# Patient Record
Sex: FEMALE | Race: WHITE | NOT HISPANIC OR LATINO | Employment: OTHER | URBAN - METROPOLITAN AREA
[De-identification: names, ages, dates, MRNs, and addresses within clinical notes are randomized per-mention and may not be internally consistent; named-entity substitution may affect disease eponyms.]

---

## 2018-02-19 DIAGNOSIS — Z11.3 SCREENING EXAMINATION FOR VENEREAL DISEASE: Primary | ICD-10-CM

## 2018-02-19 DIAGNOSIS — Z11.59 SCREENING EXAMINATION FOR POLIOMYELITIS: ICD-10-CM

## 2018-02-19 DIAGNOSIS — Z01.812 PRE-OPERATIVE LABORATORY EXAMINATION: ICD-10-CM

## 2018-02-19 DIAGNOSIS — Z11.4 SCREENING FOR HUMAN IMMUNODEFICIENCY VIRUS: ICD-10-CM

## 2018-02-19 DIAGNOSIS — Z11.8 SCREENING EXAMINATION FOR TRACHOMA: ICD-10-CM

## 2018-02-19 DIAGNOSIS — Z13.220 SCREENING FOR LIPOID DISORDERS: ICD-10-CM

## 2018-02-19 DIAGNOSIS — Z13.29 SCREENING FOR THYROID DISORDER: ICD-10-CM

## 2018-02-19 DIAGNOSIS — Z01.83 ENCOUNTER FOR BLOOD TYPING: ICD-10-CM

## 2018-02-19 DIAGNOSIS — Z01.84 IMMUNITY STATUS TESTING: ICD-10-CM

## 2018-02-19 DIAGNOSIS — Z31.89 ENCOUNTER FOR ARTIFICIAL INSEMINATION: ICD-10-CM

## 2018-02-20 DIAGNOSIS — Z13.29 SCREENING FOR THYROID DISORDER: ICD-10-CM

## 2018-02-20 DIAGNOSIS — Z11.4 SCREENING FOR HUMAN IMMUNODEFICIENCY VIRUS: ICD-10-CM

## 2018-02-20 DIAGNOSIS — Z11.59 SCREENING EXAMINATION FOR POLIOMYELITIS: ICD-10-CM

## 2018-02-20 DIAGNOSIS — Z13.220 SCREENING FOR LIPOID DISORDERS: ICD-10-CM

## 2018-02-20 DIAGNOSIS — Z11.3 SCREENING EXAMINATION FOR VENEREAL DISEASE: ICD-10-CM

## 2018-02-20 DIAGNOSIS — Z01.83 ENCOUNTER FOR BLOOD TYPING: ICD-10-CM

## 2018-02-20 DIAGNOSIS — Z31.89 ENCOUNTER FOR ARTIFICIAL INSEMINATION: ICD-10-CM

## 2018-02-20 DIAGNOSIS — Z01.812 PRE-OPERATIVE LABORATORY EXAMINATION: ICD-10-CM

## 2018-02-20 DIAGNOSIS — Z11.8 SCREENING EXAMINATION FOR TRACHOMA: ICD-10-CM

## 2018-02-20 DIAGNOSIS — Z01.84 IMMUNITY STATUS TESTING: ICD-10-CM

## 2018-02-20 LAB
ALBUMIN SERPL-MCNC: 4 G/DL (ref 3.4–5)
ALP SERPL-CCNC: 47 U/L (ref 40–150)
ALT SERPL W P-5'-P-CCNC: 17 U/L (ref 0–50)
AMPHETAMINES UR QL SCN: NEGATIVE
ANION GAP SERPL CALCULATED.3IONS-SCNC: 4 MMOL/L (ref 3–14)
AST SERPL W P-5'-P-CCNC: 11 U/L (ref 0–45)
BILIRUB SERPL-MCNC: 0.5 MG/DL (ref 0.2–1.3)
BLD GP AB SCN SERPL QL: NORMAL
BUN SERPL-MCNC: 16 MG/DL (ref 7–30)
CALCIUM SERPL-MCNC: 7.9 MG/DL (ref 8.5–10.1)
CANNABINOIDS UR QL: NEGATIVE
CHLORIDE SERPL-SCNC: 106 MMOL/L (ref 94–109)
CO2 SERPL-SCNC: 30 MMOL/L (ref 20–32)
COCAINE UR QL: NEGATIVE
CREAT SERPL-MCNC: 0.73 MG/DL (ref 0.52–1.04)
DEPRECATED CALCIDIOL+CALCIFEROL SERPL-MC: 10 UG/L (ref 20–75)
ERYTHROCYTE [DISTWIDTH] IN BLOOD BY AUTOMATED COUNT: 12.7 % (ref 10–15)
GFR SERPL CREATININE-BSD FRML MDRD: >90 ML/MIN/1.7M2
GLUCOSE SERPL-MCNC: 94 MG/DL (ref 70–99)
HBV SURFACE AG SERPL QL IA: NONREACTIVE
HCT VFR BLD AUTO: 39.6 % (ref 35–47)
HCV AB SERPL QL IA: NONREACTIVE
HGB BLD-MCNC: 13.3 G/DL (ref 11.7–15.7)
HIV 1+2 AB+HIV1 P24 AG SERPL QL IA: NONREACTIVE
MCH RBC QN AUTO: 29.9 PG (ref 26.5–33)
MCHC RBC AUTO-ENTMCNC: 33.6 G/DL (ref 31.5–36.5)
MCV RBC AUTO: 89 FL (ref 78–100)
OPIATES UR QL SCN: NEGATIVE
PCP UR QL SCN: NEGATIVE
PLATELET # BLD AUTO: 248 10E9/L (ref 150–450)
POTASSIUM SERPL-SCNC: 3.5 MMOL/L (ref 3.4–5.3)
PROT SERPL-MCNC: 6.9 G/DL (ref 6.8–8.8)
RBC # BLD AUTO: 4.45 10E12/L (ref 3.8–5.2)
SODIUM SERPL-SCNC: 140 MMOL/L (ref 133–144)
T4 FREE SERPL-MCNC: 0.95 NG/DL (ref 0.76–1.46)
TSH SERPL DL<=0.005 MIU/L-ACNC: 0.86 MU/L (ref 0.4–4)
WBC # BLD AUTO: 6.2 10E9/L (ref 4–11)

## 2018-02-20 PROCEDURE — 87340 HEPATITIS B SURFACE AG IA: CPT | Performed by: SPECIALIST

## 2018-02-20 PROCEDURE — 87591 N.GONORRHOEAE DNA AMP PROB: CPT | Performed by: SPECIALIST

## 2018-02-20 PROCEDURE — 87389 HIV-1 AG W/HIV-1&-2 AB AG IA: CPT | Performed by: SPECIALIST

## 2018-02-20 PROCEDURE — 85027 COMPLETE CBC AUTOMATED: CPT | Performed by: SPECIALIST

## 2018-02-20 PROCEDURE — 86787 VARICELLA-ZOSTER ANTIBODY: CPT | Performed by: SPECIALIST

## 2018-02-20 PROCEDURE — 86803 HEPATITIS C AB TEST: CPT | Performed by: SPECIALIST

## 2018-02-20 PROCEDURE — 36415 COLL VENOUS BLD VENIPUNCTURE: CPT | Performed by: SPECIALIST

## 2018-02-20 PROCEDURE — 80307 DRUG TEST PRSMV CHEM ANLYZR: CPT | Performed by: SPECIALIST

## 2018-02-20 PROCEDURE — 86762 RUBELLA ANTIBODY: CPT | Performed by: SPECIALIST

## 2018-02-20 PROCEDURE — 82306 VITAMIN D 25 HYDROXY: CPT | Performed by: SPECIALIST

## 2018-02-20 PROCEDURE — 84443 ASSAY THYROID STIM HORMONE: CPT | Performed by: SPECIALIST

## 2018-02-20 PROCEDURE — 84439 ASSAY OF FREE THYROXINE: CPT | Performed by: SPECIALIST

## 2018-02-20 PROCEDURE — 80053 COMPREHEN METABOLIC PANEL: CPT | Performed by: SPECIALIST

## 2018-02-20 PROCEDURE — 86780 TREPONEMA PALLIDUM: CPT | Performed by: SPECIALIST

## 2018-02-20 PROCEDURE — 87491 CHLMYD TRACH DNA AMP PROBE: CPT | Performed by: SPECIALIST

## 2018-02-20 PROCEDURE — 86850 RBC ANTIBODY SCREEN: CPT | Performed by: SPECIALIST

## 2018-02-21 LAB
C TRACH DNA SPEC QL NAA+PROBE: NEGATIVE
N GONORRHOEA DNA SPEC QL NAA+PROBE: NEGATIVE
RUBV IGG SERPL IA-ACNC: 26 IU/ML
SPECIMEN SOURCE: NORMAL
SPECIMEN SOURCE: NORMAL
T PALLIDUM IGG+IGM SER QL: NEGATIVE
VZV IGG SER QL IA: 3.7 AI (ref 0–0.8)

## 2018-03-15 DIAGNOSIS — Z31.83 ENCOUNTER FOR ASSISTED REPRODUCTIVE FERTILITY CYCLE: Primary | ICD-10-CM

## 2018-04-02 DIAGNOSIS — Z31.83 ENCOUNTER FOR ASSISTED REPRODUCTIVE FERTILITY CYCLE: ICD-10-CM

## 2018-04-02 LAB — ESTRADIOL SERPL-MCNC: 53 PG/ML

## 2018-04-02 PROCEDURE — 36415 COLL VENOUS BLD VENIPUNCTURE: CPT | Performed by: SPECIALIST

## 2018-04-02 PROCEDURE — 82670 ASSAY OF TOTAL ESTRADIOL: CPT | Performed by: SPECIALIST

## 2018-04-12 DIAGNOSIS — Z31.83 ENCOUNTER FOR ASSISTED REPRODUCTIVE FERTILITY CYCLE: ICD-10-CM

## 2018-04-12 LAB
ESTRADIOL SERPL-MCNC: 171 PG/ML
PROGEST SERPL-MCNC: 0.3 NG/ML

## 2018-04-12 PROCEDURE — 84144 ASSAY OF PROGESTERONE: CPT | Performed by: SPECIALIST

## 2018-04-12 PROCEDURE — 36415 COLL VENOUS BLD VENIPUNCTURE: CPT | Performed by: SPECIALIST

## 2018-04-12 PROCEDURE — 82670 ASSAY OF TOTAL ESTRADIOL: CPT | Performed by: SPECIALIST

## 2018-04-16 DIAGNOSIS — Z31.83 ENCOUNTER FOR ASSISTED REPRODUCTIVE FERTILITY CYCLE: Primary | ICD-10-CM

## 2018-04-17 DIAGNOSIS — Z31.83 ENCOUNTER FOR ASSISTED REPRODUCTIVE FERTILITY CYCLE: ICD-10-CM

## 2018-04-17 LAB
ESTRADIOL SERPL-MCNC: 646 PG/ML
PROGEST SERPL-MCNC: <0.2 NG/ML

## 2018-04-17 PROCEDURE — 82670 ASSAY OF TOTAL ESTRADIOL: CPT | Performed by: SPECIALIST

## 2018-04-17 PROCEDURE — 36415 COLL VENOUS BLD VENIPUNCTURE: CPT | Performed by: SPECIALIST

## 2018-04-17 PROCEDURE — 84144 ASSAY OF PROGESTERONE: CPT | Performed by: SPECIALIST

## 2018-04-20 DIAGNOSIS — Z31.83 ENCOUNTER FOR ASSISTED REPRODUCTIVE FERTILITY CYCLE: Primary | ICD-10-CM

## 2018-04-20 DIAGNOSIS — Z31.83 ENCOUNTER FOR ASSISTED REPRODUCTIVE FERTILITY CYCLE: ICD-10-CM

## 2018-04-20 LAB
ESTRADIOL SERPL-MCNC: 715 PG/ML
PROGEST SERPL-MCNC: 19.8 NG/ML

## 2018-04-20 PROCEDURE — 82670 ASSAY OF TOTAL ESTRADIOL: CPT | Performed by: SPECIALIST

## 2018-04-20 PROCEDURE — 36415 COLL VENOUS BLD VENIPUNCTURE: CPT | Performed by: SPECIALIST

## 2018-04-20 PROCEDURE — 84144 ASSAY OF PROGESTERONE: CPT | Performed by: SPECIALIST

## 2018-04-27 DIAGNOSIS — Z32.00 PREGNANCY EXAMINATION OR TEST, PREGNANCY UNCONFIRMED: Primary | ICD-10-CM

## 2018-08-23 ENCOUNTER — MEDICAL CORRESPONDENCE (OUTPATIENT)
Dept: HEALTH INFORMATION MANAGEMENT | Facility: CLINIC | Age: 33
End: 2018-08-23

## 2018-08-23 ENCOUNTER — TRANSFERRED RECORDS (OUTPATIENT)
Dept: HEALTH INFORMATION MANAGEMENT | Facility: CLINIC | Age: 33
End: 2018-08-23

## 2018-09-13 ENCOUNTER — TELEPHONE (OUTPATIENT)
Dept: MATERNAL FETAL MEDICINE | Facility: CLINIC | Age: 33
End: 2018-09-13

## 2018-09-13 NOTE — TELEPHONE ENCOUNTER
Per Meenakshi Fountain-        Patient was scheduled for an echo on 9/7, but per pt, insurance is out of network and so her and her partner have decided to find a place to complete exam that is in network. Writer will remove orders.     Kay Walters

## 2020-10-05 DIAGNOSIS — Z31.83 ENCOUNTER FOR ASSISTED REPRODUCTIVE FERTILITY CYCLE: Primary | ICD-10-CM

## 2022-08-10 ENCOUNTER — LAB REQUISITION (OUTPATIENT)
Dept: LAB | Facility: CLINIC | Age: 37
End: 2022-08-10
Payer: COMMERCIAL

## 2022-08-10 DIAGNOSIS — Z36.89 ENCOUNTER FOR OTHER SPECIFIED ANTENATAL SCREENING: ICD-10-CM

## 2022-08-10 PROCEDURE — 87653 STREP B DNA AMP PROBE: CPT | Mod: ORL | Performed by: OBSTETRICS & GYNECOLOGY

## 2022-08-11 LAB — GP B STREP DNA SPEC QL NAA+PROBE: NEGATIVE

## 2024-02-19 ENCOUNTER — LAB REQUISITION (OUTPATIENT)
Dept: LAB | Facility: CLINIC | Age: 39
End: 2024-02-19
Payer: COMMERCIAL

## 2024-02-19 DIAGNOSIS — Z13.29 ENCOUNTER FOR SCREENING FOR OTHER SUSPECTED ENDOCRINE DISORDER: ICD-10-CM

## 2024-02-19 DIAGNOSIS — Z01.419 ENCOUNTER FOR GYNECOLOGICAL EXAMINATION (GENERAL) (ROUTINE) WITHOUT ABNORMAL FINDINGS: ICD-10-CM

## 2024-02-19 DIAGNOSIS — Z13.1 ENCOUNTER FOR SCREENING FOR DIABETES MELLITUS: ICD-10-CM

## 2024-02-19 DIAGNOSIS — Z13.220 ENCOUNTER FOR SCREENING FOR LIPOID DISORDERS: ICD-10-CM

## 2024-02-19 LAB
CHOLEST SERPL-MCNC: 239 MG/DL
FASTING STATUS PATIENT QL REPORTED: NO
HBA1C MFR BLD: 5.4 %
HDLC SERPL-MCNC: 38 MG/DL
LDLC SERPL CALC-MCNC: ABNORMAL MG/DL
NONHDLC SERPL-MCNC: 201 MG/DL
TRIGL SERPL-MCNC: 441 MG/DL
TSH SERPL DL<=0.005 MIU/L-ACNC: 1.47 UIU/ML (ref 0.3–4.2)

## 2024-02-19 PROCEDURE — 80061 LIPID PANEL: CPT | Mod: ORL | Performed by: OBSTETRICS & GYNECOLOGY

## 2024-02-19 PROCEDURE — 83036 HEMOGLOBIN GLYCOSYLATED A1C: CPT | Performed by: OBSTETRICS & GYNECOLOGY

## 2024-02-19 PROCEDURE — 84443 ASSAY THYROID STIM HORMONE: CPT | Performed by: OBSTETRICS & GYNECOLOGY

## 2024-02-19 PROCEDURE — 87624 HPV HI-RISK TYP POOLED RSLT: CPT | Mod: ORL | Performed by: OBSTETRICS & GYNECOLOGY

## 2024-02-19 PROCEDURE — G0145 SCR C/V CYTO,THINLAYER,RESCR: HCPCS | Performed by: OBSTETRICS & GYNECOLOGY

## 2024-02-19 PROCEDURE — 87624 HPV HI-RISK TYP POOLED RSLT: CPT | Performed by: OBSTETRICS & GYNECOLOGY

## 2024-02-19 PROCEDURE — G0145 SCR C/V CYTO,THINLAYER,RESCR: HCPCS | Mod: ORL | Performed by: OBSTETRICS & GYNECOLOGY

## 2024-02-22 LAB
BKR LAB AP GYN ADEQUACY: NORMAL
BKR LAB AP GYN INTERPRETATION: NORMAL
BKR LAB AP HPV REFLEX: NORMAL
BKR LAB AP LMP: NORMAL
BKR LAB AP PREVIOUS ABNL DX: NORMAL
PATH REPORT.COMMENTS IMP SPEC: NORMAL
PATH REPORT.COMMENTS IMP SPEC: NORMAL
PATH REPORT.RELEVANT HX SPEC: NORMAL

## 2024-02-26 LAB
HUMAN PAPILLOMA VIRUS 16 DNA: NEGATIVE
HUMAN PAPILLOMA VIRUS 18 DNA: NEGATIVE
HUMAN PAPILLOMA VIRUS FINAL DIAGNOSIS: NORMAL
HUMAN PAPILLOMA VIRUS OTHER HR: NEGATIVE

## 2024-07-23 ENCOUNTER — LAB REQUISITION (OUTPATIENT)
Dept: LAB | Facility: CLINIC | Age: 39
End: 2024-07-23
Payer: COMMERCIAL

## 2024-07-23 DIAGNOSIS — N97.9 FEMALE INFERTILITY, UNSPECIFIED: ICD-10-CM

## 2024-07-23 LAB
ESTRADIOL SERPL-MCNC: 537 PG/ML
HCG INTACT+B SERPL-ACNC: <1 MIU/ML
PROGEST SERPL-MCNC: 0.3 NG/ML

## 2024-07-23 PROCEDURE — 84702 CHORIONIC GONADOTROPIN TEST: CPT | Mod: ORL | Performed by: PHYSICIAN ASSISTANT

## 2024-07-23 PROCEDURE — 84144 ASSAY OF PROGESTERONE: CPT | Mod: ORL | Performed by: PHYSICIAN ASSISTANT

## 2024-07-23 PROCEDURE — 82670 ASSAY OF TOTAL ESTRADIOL: CPT | Mod: ORL | Performed by: PHYSICIAN ASSISTANT

## 2024-09-22 ENCOUNTER — TRANSFERRED RECORDS (OUTPATIENT)
Dept: HEALTH INFORMATION MANAGEMENT | Facility: CLINIC | Age: 39
End: 2024-09-22
Payer: COMMERCIAL

## 2024-09-23 ENCOUNTER — MEDICAL CORRESPONDENCE (OUTPATIENT)
Dept: HEALTH INFORMATION MANAGEMENT | Facility: CLINIC | Age: 39
End: 2024-09-23

## 2024-09-23 ENCOUNTER — LAB REQUISITION (OUTPATIENT)
Dept: LAB | Facility: CLINIC | Age: 39
End: 2024-09-23
Payer: COMMERCIAL

## 2024-09-23 DIAGNOSIS — Z36.89 ENCOUNTER FOR OTHER SPECIFIED ANTENATAL SCREENING: ICD-10-CM

## 2024-09-23 LAB
BASOPHILS # BLD AUTO: 0 10E3/UL (ref 0–0.2)
BASOPHILS NFR BLD AUTO: 0 %
EOSINOPHIL # BLD AUTO: 0.1 10E3/UL (ref 0–0.7)
EOSINOPHIL NFR BLD AUTO: 1 %
ERYTHROCYTE [DISTWIDTH] IN BLOOD BY AUTOMATED COUNT: 13.2 % (ref 10–15)
EST. AVERAGE GLUCOSE BLD GHB EST-MCNC: 103 MG/DL
HBA1C MFR BLD: 5.2 %
HCT VFR BLD AUTO: 41.4 % (ref 35–47)
HGB BLD-MCNC: 14 G/DL (ref 11.7–15.7)
HIV 1+2 AB+HIV1 P24 AG SERPL QL IA: NONREACTIVE
IMM GRANULOCYTES # BLD: 0.1 10E3/UL
IMM GRANULOCYTES NFR BLD: 1 %
LYMPHOCYTES # BLD AUTO: 2.2 10E3/UL (ref 0.8–5.3)
LYMPHOCYTES NFR BLD AUTO: 21 %
MCH RBC QN AUTO: 31.3 PG (ref 26.5–33)
MCHC RBC AUTO-ENTMCNC: 33.8 G/DL (ref 31.5–36.5)
MCV RBC AUTO: 93 FL (ref 78–100)
MONOCYTES # BLD AUTO: 0.6 10E3/UL (ref 0–1.3)
MONOCYTES NFR BLD AUTO: 6 %
NEUTROPHILS # BLD AUTO: 7.4 10E3/UL (ref 1.6–8.3)
NEUTROPHILS NFR BLD AUTO: 71 %
NRBC # BLD AUTO: 0 10E3/UL
NRBC BLD AUTO-RTO: 0 /100
PLATELET # BLD AUTO: 289 10E3/UL (ref 150–450)
RBC # BLD AUTO: 4.47 10E6/UL (ref 3.8–5.2)
WBC # BLD AUTO: 10.3 10E3/UL (ref 4–11)

## 2024-09-23 PROCEDURE — 86780 TREPONEMA PALLIDUM: CPT | Mod: ORL | Performed by: ADVANCED PRACTICE MIDWIFE

## 2024-09-23 PROCEDURE — 85025 COMPLETE CBC W/AUTO DIFF WBC: CPT | Mod: ORL | Performed by: ADVANCED PRACTICE MIDWIFE

## 2024-09-23 PROCEDURE — 87086 URINE CULTURE/COLONY COUNT: CPT | Mod: ORL | Performed by: ADVANCED PRACTICE MIDWIFE

## 2024-09-23 PROCEDURE — 86900 BLOOD TYPING SEROLOGIC ABO: CPT | Mod: ORL | Performed by: ADVANCED PRACTICE MIDWIFE

## 2024-09-23 PROCEDURE — 86803 HEPATITIS C AB TEST: CPT | Mod: ORL | Performed by: ADVANCED PRACTICE MIDWIFE

## 2024-09-23 PROCEDURE — 83036 HEMOGLOBIN GLYCOSYLATED A1C: CPT | Mod: ORL | Performed by: ADVANCED PRACTICE MIDWIFE

## 2024-09-23 PROCEDURE — 86762 RUBELLA ANTIBODY: CPT | Mod: ORL | Performed by: ADVANCED PRACTICE MIDWIFE

## 2024-09-23 PROCEDURE — 87389 HIV-1 AG W/HIV-1&-2 AB AG IA: CPT | Mod: ORL | Performed by: ADVANCED PRACTICE MIDWIFE

## 2024-09-23 PROCEDURE — 86901 BLOOD TYPING SEROLOGIC RH(D): CPT | Mod: ORL | Performed by: ADVANCED PRACTICE MIDWIFE

## 2024-09-23 PROCEDURE — 87340 HEPATITIS B SURFACE AG IA: CPT | Mod: ORL | Performed by: ADVANCED PRACTICE MIDWIFE

## 2024-09-24 ENCOUNTER — TRANSCRIBE ORDERS (OUTPATIENT)
Dept: MATERNAL FETAL MEDICINE | Facility: CLINIC | Age: 39
End: 2024-09-24
Payer: COMMERCIAL

## 2024-09-24 ENCOUNTER — MEDICAL CORRESPONDENCE (OUTPATIENT)
Dept: HEALTH INFORMATION MANAGEMENT | Facility: CLINIC | Age: 39
End: 2024-09-24
Payer: COMMERCIAL

## 2024-09-24 DIAGNOSIS — O26.90 PREGNANCY RELATED CONDITION, ANTEPARTUM: Primary | ICD-10-CM

## 2024-09-24 LAB
ABO/RH(D): NORMAL
ANTIBODY SCREEN: NEGATIVE
HBV SURFACE AG SERPL QL IA: NONREACTIVE
HCV AB SERPL QL IA: NONREACTIVE
RUBV IGG SERPL QL IA: 2.02 INDEX
RUBV IGG SERPL QL IA: POSITIVE
SPECIMEN EXPIRATION DATE: NORMAL
T PALLIDUM AB SER QL: NONREACTIVE

## 2024-09-25 LAB — BACTERIA UR CULT: NORMAL

## 2024-11-21 ENCOUNTER — PRE VISIT (OUTPATIENT)
Dept: MATERNAL FETAL MEDICINE | Facility: CLINIC | Age: 39
End: 2024-11-21
Payer: COMMERCIAL

## 2024-11-25 ENCOUNTER — HOSPITAL ENCOUNTER (OUTPATIENT)
Dept: ULTRASOUND IMAGING | Facility: CLINIC | Age: 39
Discharge: HOME | End: 2024-11-25
Attending: OBSTETRICS & GYNECOLOGY
Payer: COMMERCIAL

## 2024-11-25 ENCOUNTER — OFFICE VISIT (OUTPATIENT)
Dept: MATERNAL FETAL MEDICINE | Facility: CLINIC | Age: 39
End: 2024-11-25
Attending: OBSTETRICS & GYNECOLOGY
Payer: COMMERCIAL

## 2024-11-25 DIAGNOSIS — O26.90 PREGNANCY RELATED CONDITION, ANTEPARTUM: ICD-10-CM

## 2024-11-25 DIAGNOSIS — O09.523 MULTIGRAVIDA OF ADVANCED MATERNAL AGE IN THIRD TRIMESTER: Primary | ICD-10-CM

## 2024-11-25 DIAGNOSIS — O26.90 PREGNANCY RELATED CONDITION, ANTEPARTUM: Primary | ICD-10-CM

## 2024-11-25 DIAGNOSIS — O09.813 PREGNANCY RESULTING FROM IN VITRO FERTILIZATION IN THIRD TRIMESTER: ICD-10-CM

## 2024-11-25 PROCEDURE — 96040 HC GENETIC COUNSELING, EACH 30 MINUTES: CPT

## 2024-11-25 PROCEDURE — 76811 OB US DETAILED SNGL FETUS: CPT

## 2024-11-25 NOTE — PROGRESS NOTES
[facility]  Genetic Counseling Consult    Patient:  Yelitza Gomez YOB: 1985   Date of Service:  24   MRN: [id number]    Yelitza was seen at the [facility] for genetic consultation. The indication for genetic counseling is advanced maternal age. This is a gestational carrier pregnancy.  The patient was accompanied to this visit by intended parents, Cecil and Bruno.    IMPRESSION/ PLAN   1. Yelitza has not had genetic screening in this pregnancy and declines options discussed today.    2. During today's MF visit, Yelitza had a genetic counseling session only. Screening and diagnostic testing was discussed and declined.     3. Yelitza had a level II comprehensive anatomy ultrasound today. Please see the ultrasound report for further details.    4. Further recommendations include a fetal echocardiogram with [facility]. The upcoming ultrasound has been scheduled for 2024.    PREGNANCY HISTORY   /Parity:       Yelitza's pregnancy history is significant for:   Term  gestational carrier    Twin 2018 gestational carrier  Term   Term   Term 2007  SAB    CURRENT PREGNANCY   Current Age: 39 year old     Age at Delivery: 39 year old    TOMÁS: 2025, by Ultrasound                                     Gestational Age: 19w3d    This pregnancy is a single gestation.     This pregnancy was conceived with in vitro fertilization (IVF). The following was discussed:       Number of transferred embryos: 1  Use of egg or sperm donor: donor egg and of the FOB's sperm  Age of egg retrieval: 24    Preimplantation genetic testing (PGT) was performed on the embryos and the results were normal (per patient report, results not available for review)  PGT-A is a screening test. Therefore, a normal PGT-A result significantly reduces but does not eliminate the possibility of aneuploidy. Invasive testing (such as  amniocentesis) is recommended if the patient desires definitive information regarding aneuploidy in pregnancy. We discussed the limitation of NIPT following PGT-A and that pursuing multiple screening tests may result in conflicting information in which case the option of invasive testing would be reviewed again.  Fetal echocardiogram will be recommended and scheduled after the 18-20 week fetal anatomy ultrasound  The average pregnancy has a 0.5-1.0% chance of a congenital heart defect. However, studies suggest an increased chance for a heart defect for IVF pregnancies, with estimates ranging from 1-3.3%. Fetal echocardiograms are typically performed at 20 to 24 weeks gestation.    Yelitza reports no bleeding, complications, illnesses, fever or exposure concerns with this pregnancy.     MEDICAL HISTORY   Yelitza s reported medical history is not expected to impact pregnancy management or risks to fetal development.        FAMILY HISTORY   Broadly, the reported family history for intended parents is unremarkable for multiple miscarriages, stillbirths, birth defects, intellectual disabilities, known genetic conditions, and consanguinity.       RISK ASSESSMENT FOR CHROMOSOME CONDITIONS   We explained that the risk for fetal chromosome abnormalities increases with maternal age. We discussed specific features of common chromosome abnormalities, including Down syndrome, trisomy 13, trisomy 18, and sex chromosome trisomies.    At age 24 at midtrimester, the risk to have a baby with Down syndrome is 1 in 1087.  At age 24 at midtrimester, the risk to have a baby with any chromosome abnormality is 1 in 544.     Yelitza has not had genetic screening in this pregnancy and declines options discussed today.     RISK ASSESSMENT FOR INHERITED CONDITIONS   We discussed that every pregnancy has a chance to have an inherited single-gene condition, even when there is no family history of that condition. In fact, approximately 90% of  couples at an increased reproductive risk for an inherited condition have no family history of that condition. The average person may be a carrier for 5-10 different genetic variants that can increase the chance for their pregnancies to have that condition. We discussed autosomal recessive conditions and X-linked conditions. Autosomal recessive conditions happen when a mutation has been inherited from the egg and sperm and include conditions like cystic fibrosis, thalassemia, hearing loss, spinal muscular atrophy, and more. X-linked conditions happen when a mutation has been inherited from the egg and include conditions like fragile X syndrome.     We reviewed that when both biological parents carry a harmful genetic change in a gene associated with autosomal recessive inheritance, each of their pregnancies has a 1 in 4 (25%) chance to be affected by that condition. With x-linked conditions, the specific risk generally depends on the chromosomal sex of the fetus, with XY individuals (generally male) being most severely affected.     [place] screening was reviewed. About [place] Screening     The intended parents reported that they completed carrier screening and are not mutual carrier with the egg donor. A copy of these reports were not available today.   GENETIC TESTING OPTIONS   Genetic testing during a pregnancy includes screening and diagnostic procedures.      Screening tests are non-invasive which means no risk to the pregnancy and includes ultrasounds and blood work. The benefits and limitations of screening were reviewed. Screening tests provide a risk assessment (chance) specific to the pregnancy for certain fetal chromosome abnormalities but cannot definitively diagnose or exclude a fetal chromosome abnormality. Follow-up genetic counseling and consideration of diagnostic testing is recommended with any abnormal screening result. Diagnostic testing during a pregnancy is more certain and can test for  more conditions. However, the tests do have a risk of miscarriage that requires careful consideration. These tests can detect fetal chromosome abnormalities with greater than 99% certainty. Results can be compromised by maternal cell contamination or mosaicism and are limited by the resolution of current genetic testing technology.     There is no screening or diagnostic test that detects all forms of birth defects or intellectual disability.     We discussed the following screening options:     Non-invasive prenatal testing (NIPT)  Also called cell-free DNA screening because it detect chromosome fragments from the placenta in the pregnant person's blood.  Can be done any time after 10 weeks gestation.  Screens for trisomy 21, trisomy 18, trisomy 13, and sex chromosome aneuploidies. [facility] also recommends consideration of screening for 22q11.2 microdeletion syndrome.  Does not screen for all known chromosomal conditions.  Even with negative results, a residual risk for screened conditions remains.  Cannot screen for open neural tube defects, maternal serum AFP after 15 weeks is recommended  We discussed the following ultrasound options:  Comprehensive level II ultrasound (Fetal Anatomy Ultrasound)  Ultrasound done between 18-20 weeks gestation  Screens for major birth defects and markers for aneuploidy (like trisomy 21 and trisomy 18)  Includes looking at the fetus/baby's growth, heart, organs (stomach, kidneys), placenta, and amniotic fluid    We discussed the following diagnostic options:   Amniocentesis  Invasive diagnostic procedure done after 15 weeks gestation  The procedure collects a small sample of amniotic fluid for the purpose of chromosomal testing and/or other genetic testing  Diagnostic result; more than 99% sensitivity for fetal chromosome abnormalities  Testing for AFP in the amniotic fluid can test for open neural tube defects      It was a pleasure to be involved with Yelitza s care. Face-to-face time  of the meeting was  20  minutes.    AMBER UNGER MS, [facility]  Genetic Counselor  [facility]  Maternal Fetal Medicine  Office: [phone]   [facility]: [phone]   Fax: [phone]  [facility]

## 2024-11-25 NOTE — PROGRESS NOTES
The patient was seen for an ultrasound in the Maternal-Fetal Medicine Center today.  For a detailed report of the ultrasound examination, please see the ultrasound report which can be found under the imaging tab.    If you have questions regarding today's evaluation or if we can be of further service, please contact the Maternal-Fetal Medicine Center.    Carmen Mccollum MD  , OB/GYN  Maternal-Fetal Medicine

## 2024-11-25 NOTE — NURSING NOTE
Yelitza is here for ultrasound today with [facility]. Denies questions or concerns today. Patient reports feeling alot fetal movement, denies contractions, leaking of fluid, or bleeding. Education provided to patient on today's ultrasound.  SBAR given to ELIZABET SHARP, see their note in Epic.

## 2024-12-20 ENCOUNTER — HOSPITAL ENCOUNTER (OUTPATIENT)
Dept: ULTRASOUND IMAGING | Facility: CLINIC | Age: 39
Discharge: HOME | End: 2024-12-20
Attending: OBSTETRICS & GYNECOLOGY
Payer: COMMERCIAL

## 2024-12-20 PROCEDURE — 93325 DOPPLER ECHO COLOR FLOW MAPG: CPT

## 2024-12-20 PROCEDURE — 76825 ECHO EXAM OF FETAL HEART: CPT

## 2025-01-20 ENCOUNTER — LAB REQUISITION (OUTPATIENT)
Dept: LAB | Facility: CLINIC | Age: 40
End: 2025-01-20
Payer: COMMERCIAL

## 2025-01-20 DIAGNOSIS — Z36.89 ENCOUNTER FOR OTHER SPECIFIED ANTENATAL SCREENING: ICD-10-CM

## 2025-01-20 PROCEDURE — 86780 TREPONEMA PALLIDUM: CPT | Mod: ORL | Performed by: OBSTETRICS & GYNECOLOGY

## 2025-01-21 LAB — T PALLIDUM AB SER QL: NONREACTIVE

## 2025-03-17 ENCOUNTER — MEDICAL CORRESPONDENCE (OUTPATIENT)
Dept: HEALTH INFORMATION MANAGEMENT | Facility: CLINIC | Age: 40
End: 2025-03-17
Payer: COMMERCIAL

## 2025-03-17 ENCOUNTER — TRANSFERRED RECORDS (OUTPATIENT)
Dept: HEALTH INFORMATION MANAGEMENT | Facility: CLINIC | Age: 40
End: 2025-03-17
Payer: COMMERCIAL

## 2025-03-18 ENCOUNTER — TRANSCRIBE ORDERS (OUTPATIENT)
Dept: MATERNAL FETAL MEDICINE | Facility: CLINIC | Age: 40
End: 2025-03-18
Payer: COMMERCIAL

## 2025-03-18 ENCOUNTER — OFFICE VISIT (OUTPATIENT)
Dept: MATERNAL FETAL MEDICINE | Facility: CLINIC | Age: 40
End: 2025-03-18
Attending: OBSTETRICS & GYNECOLOGY
Payer: COMMERCIAL

## 2025-03-18 ENCOUNTER — HOSPITAL ENCOUNTER (OUTPATIENT)
Dept: ULTRASOUND IMAGING | Facility: CLINIC | Age: 40
Discharge: HOME | End: 2025-03-18
Attending: OBSTETRICS & GYNECOLOGY
Payer: COMMERCIAL

## 2025-03-18 DIAGNOSIS — O40.3XX0 POLYHYDRAMNIOS AFFECTING PREGNANCY IN THIRD TRIMESTER: Primary | ICD-10-CM

## 2025-03-18 DIAGNOSIS — O09.813 PREGNANCY RESULTING FROM IN VITRO FERTILIZATION IN THIRD TRIMESTER: ICD-10-CM

## 2025-03-18 DIAGNOSIS — O26.90 PREGNANCY RELATED CONDITION, ANTEPARTUM: Primary | ICD-10-CM

## 2025-03-18 DIAGNOSIS — O26.90 PREGNANCY RELATED CONDITION, ANTEPARTUM: ICD-10-CM

## 2025-03-18 DIAGNOSIS — O09.523 MULTIGRAVIDA OF ADVANCED MATERNAL AGE IN THIRD TRIMESTER: ICD-10-CM

## 2025-03-18 PROCEDURE — 76816 OB US FOLLOW-UP PER FETUS: CPT

## 2025-03-18 NOTE — NURSING NOTE
Yelitza Gomez is here for ultrasound today. Denies questions or concerns today. Patient reports lots fetal movement, denies contractions, leaking of fluid, or bleeding. Education provided to patient on today's ultrasound.  SBAR given to ELIZABET SHARP, see their note in Epic.

## 2025-03-18 NOTE — PROGRESS NOTES
"Please see \"Imaging\" tab under \"Chart Review\" for details of today's visit.    Lionel Atkins    "